# Patient Record
Sex: MALE | Race: WHITE
[De-identification: names, ages, dates, MRNs, and addresses within clinical notes are randomized per-mention and may not be internally consistent; named-entity substitution may affect disease eponyms.]

---

## 2018-01-05 ENCOUNTER — HOSPITAL ENCOUNTER (EMERGENCY)
Dept: HOSPITAL 45 - C.EDB | Age: 58
Discharge: HOME | End: 2018-01-05
Payer: COMMERCIAL

## 2018-01-05 VITALS — TEMPERATURE: 98.6 F

## 2018-01-05 VITALS
HEIGHT: 65.98 IN | BODY MASS INDEX: 38.23 KG/M2 | WEIGHT: 237.88 LBS | BODY MASS INDEX: 38.23 KG/M2 | WEIGHT: 237.88 LBS | HEIGHT: 65.98 IN

## 2018-01-05 VITALS — HEART RATE: 68 BPM | DIASTOLIC BLOOD PRESSURE: 72 MMHG | SYSTOLIC BLOOD PRESSURE: 115 MMHG | OXYGEN SATURATION: 95 %

## 2018-01-05 DIAGNOSIS — A04.72: Primary | ICD-10-CM

## 2018-01-05 DIAGNOSIS — E87.6: ICD-10-CM

## 2018-01-05 DIAGNOSIS — Z79.82: ICD-10-CM

## 2018-01-05 LAB
ALBUMIN SERPL-MCNC: 3.5 GM/DL (ref 3.4–5)
ALP SERPL-CCNC: 89 U/L (ref 45–117)
ALT SERPL-CCNC: 48 U/L (ref 12–78)
AST SERPL-CCNC: 37 U/L (ref 15–37)
BASOPHILS # BLD: 0.02 K/UL (ref 0–0.2)
BASOPHILS NFR BLD: 0.1 %
BUN SERPL-MCNC: 16 MG/DL (ref 7–18)
CALCIUM SERPL-MCNC: 8.7 MG/DL (ref 8.5–10.1)
CO2 SERPL-SCNC: 28 MMOL/L (ref 21–32)
CREAT SERPL-MCNC: 1.06 MG/DL (ref 0.6–1.4)
EOS ABS #: 0.1 K/UL (ref 0–0.5)
EOSINOPHIL NFR BLD AUTO: 243 K/UL (ref 130–400)
GLUCOSE SERPL-MCNC: 109 MG/DL (ref 70–99)
HCT VFR BLD CALC: 46.3 % (ref 42–52)
HGB BLD-MCNC: 16.2 G/DL (ref 14–18)
IG#: 0.09 K/UL (ref 0–0.02)
IMM GRANULOCYTES NFR BLD AUTO: 9.2 %
LYMPHOCYTES # BLD: 1.83 K/UL (ref 1.2–3.4)
MCH RBC QN AUTO: 29.3 PG (ref 25–34)
MCHC RBC AUTO-ENTMCNC: 35 G/DL (ref 32–36)
MCV RBC AUTO: 83.7 FL (ref 80–100)
MONO ABS #: 1.06 K/UL (ref 0.11–0.59)
MONOCYTES NFR BLD: 5.3 %
NEUT ABS #: 16.84 K/UL (ref 1.4–6.5)
NEUTROPHILS # BLD AUTO: 0.5 %
NEUTROPHILS NFR BLD AUTO: 84.4 %
PMV BLD AUTO: 9.7 FL (ref 7.4–10.4)
POTASSIUM SERPL-SCNC: 3.2 MMOL/L (ref 3.5–5.1)
PROT SERPL-MCNC: 7.7 GM/DL (ref 6.4–8.2)
RED CELL DISTRIBUTION WIDTH CV: 13.9 % (ref 11.5–14.5)
RED CELL DISTRIBUTION WIDTH SD: 42.9 FL (ref 36.4–46.3)
SODIUM SERPL-SCNC: 133 MMOL/L (ref 136–145)
WBC # BLD AUTO: 19.94 K/UL (ref 4.8–10.8)

## 2018-01-05 NOTE — DIAGNOSTIC IMAGING REPORT
CT ABD/PELVIS IV AND ORAL CONT



CLINICAL HISTORY: Lower abdominal pain. History of antibiotic use.    



COMPARISON STUDY:  None.



TECHNIQUE: Following the IV administration of 93 mL of Optiray-320, CT scan of

the abdomen and pelvis was performed from the lung bases to the proximal femurs.

Images are reviewed in the axial, sagittal, and coronal planes. IV contrast was

administered without complication.  A dose lowering technique was utilized

adhering to the principles of ALARA.





CT DOSE: 1478.54 mGy.cm



FINDINGS:



Lower chest: The heart is normal in size and configuration, without pericardial

effusion. The lung bases and pleural spaces are clear.



Liver: The contrast-enhanced liver is normal in size, contour, and attenuation.

There is no intrahepatic biliary ductal dilatation. The hepatic veins and portal

veins are patent.



Gallbladder: Unremarkable.



Spleen: There is scattered nonspecific splenic hypodensities measuring up to 9

mm.



Pancreas: Unremarkable.



Adrenal glands: Unremarkable.



Kidneys: There is an 18 mm left renal cyst



Bowel: There are no transition zones indicate bowel obstruction. There is

colonic diverticulosis. There is no evidence of acute appendicitis. There is

colonic bowel wall thickening most pronounced involving the sigmoid and rectum.

The findings are indicative of a colitis.



Peritoneum: There is no intraperitoneal free air or abdominal ascites.



Vasculature: The abdominal aorta is normal in course and caliber.



Adenopathy: None.



Pelvic viscera: The bladder, and pelvic viscera are unremarkable.



Skeletal structures: No destructive osseous lesions are seen.



IMPRESSION:  

1. No evidence of bowel obstruction. No evidence of free air

2. Scattered nonspecific subcentimeter splenic hypodensities

3. No evidence of acute appendicitis

4. Colonic wall thickening most pronounced involving the rectum and sigmoid. The

findings are indicative of a nonspecific colitis







Electronically signed by:  Trung Hylton M.D.

1/5/2018 1:57 PM



Dictated Date/Time:  1/5/2018 1:44 PM

## 2018-01-05 NOTE — EMERGENCY ROOM VISIT NOTE
History


First contact with patient:  10:50


Chief Complaint:  DIARRHEA


Stated Complaint:  "BOWEL PAIN" - LIQUID STOOL FOR 4 DAYS


Nursing Triage Summary:  


c/o diarrhea over the last several days after taking Augmentin





History of Present Illness


The patient is a 57 year old male who presents to the Emergency Room via 

private vehicle with complaints of "bowel painliquid stool for 4 days".  The 

patient states that 4 days ago he abruptly started with diarrhea, and lower 

quadrant abdominal pain at baseline rated as a 3/10 and at times goes to an 8/

10.  He notes that he started taking Augmentin on December 30 for a sinus 

infection.  He notes that he has a bowel movement now every hour.  He rates the 

pain currently at bedside is a 3/10.  There is been no vomiting.





Review of Systems


A complete 10-point Review of Systems was discussed with the patient, with 

pertinent positives and negatives listed in the History of Present Illness. All 

remaining Review of Systems questions can be considered negative unless 

otherwise specified.





Past Medical/Surgical History


Noncontributory.





Family History


Noncontributory.





Social History


Smoking Status:  Never Smoker


Patient lives locally.





Current/Historical Medications


Scheduled


Amlodipine (Norvasc), 5 MG PO DAILY


Aspirin (Aspirin Ec), 325 MG PO DAILY


Enalapril Maleate (Vasotec), 1 TAB PO DAILY


Fluoxetine (Prozac), 60 MG PO QAM


Hydrochlorothiazide (Hctz), 25 MG PO DAILY





Physical Exam


Vital Signs











  Date Time  Temp Pulse Resp B/P (MAP) Pulse Ox O2 Delivery O2 Flow Rate FiO2


 


1/5/18 16:10  68 18 115/72 95 Room Air  


 


1/5/18 14:48  96 18 122/88 95 Room Air  


 


1/5/18 12:30   18 143/84 94 Room Air  


 


1/5/18 11:26 37.0 59 20 137/80 91 Room Air  


 


1/5/18 10:46 37.0 86 20 142/83 97 Room Air  











Physical Exam


VITAL SIGNS - Vital signs and nursing notes were reviewed.  Stable.  Afebrile.


GENERAL -57-year-old female appearing his stated age who is in no acute 

distress. Communicates well with provider and answers questions appropriately.


SKIN - Without rashes.  No petechial rashes.


LUNGS - Chest wall symmetric without accessory muscle use, intercostals 

retractions, or central cyanosis. Normal vesicular breath sounds CTA B/L. No 

wheezes, rales, or rhonchi appreciated.


CARDIAC - RRR with S1/S2. No murmur, rubs, or gallops appreciated. 


ABDOMEN - Abdominal contour normal without pulsations or visible masses. BS 

normoactive all four quadrants. No tenderness, palpable masses, 

hepatosplenomegaly, or ascites noted.





Medical Decision & Procedures


ER Provider


Diagnostic Interpretation:


[~ rep ct add3]]


CT ABD/PELVIS IV AND ORAL CONT





CLINICAL HISTORY: Lower abdominal pain. History of antibiotic use.    





COMPARISON STUDY:  None.





TECHNIQUE: Following the IV administration of 93 mL of Optiray-320, CT scan of


the abdomen and pelvis was performed from the lung bases to the proximal femurs.


Images are reviewed in the axial, sagittal, and coronal planes. IV contrast was


administered without complication.  A dose lowering technique was utilized


adhering to the principles of ALARA.








CT DOSE: 1478.54 mGy.cm





FINDINGS:





Lower chest: The heart is normal in size and configuration, without pericardial


effusion. The lung bases and pleural spaces are clear.





Liver: The contrast-enhanced liver is normal in size, contour, and attenuation.


There is no intrahepatic biliary ductal dilatation. The hepatic veins and portal


veins are patent.





Gallbladder: Unremarkable.





Spleen: There is scattered nonspecific splenic hypodensities measuring up to 9


mm.





Pancreas: Unremarkable.





Adrenal glands: Unremarkable.





Kidneys: There is an 18 mm left renal cyst





Bowel: There are no transition zones indicate bowel obstruction. There is


colonic diverticulosis. There is no evidence of acute appendicitis. There is


colonic bowel wall thickening most pronounced involving the sigmoid and rectum.


The findings are indicative of a colitis.





Peritoneum: There is no intraperitoneal free air or abdominal ascites.





Vasculature: The abdominal aorta is normal in course and caliber.





Adenopathy: None.





Pelvic viscera: The bladder, and pelvic viscera are unremarkable.





Skeletal structures: No destructive osseous lesions are seen.





IMPRESSION:  


1. No evidence of bowel obstruction. No evidence of free air


2. Scattered nonspecific subcentimeter splenic hypodensities


3. No evidence of acute appendicitis


4. Colonic wall thickening most pronounced involving the rectum and sigmoid. The


findings are indicative of a nonspecific colitis











Electronically signed by:  Trung Hylton M.D.


1/5/2018 1:57 PM





Dictated Date/Time:  1/5/2018 1:44 PM





Laboratory Results


1/5/18 11:30








Red Blood Count 5.53, Mean Corpuscular Volume 83.7, Mean Corpuscular Hemoglobin 

29.3, Mean Corpuscular Hemoglobin Concent 35.0, Mean Platelet Volume 9.7, 

Neutrophils (%) (Auto) 84.4, Lymphocytes (%) (Auto) 9.2, Monocytes (%) (Auto) 

5.3, Eosinophils (%) (Auto) 0.5, Basophils (%) (Auto) 0.1, Neutrophils # (Auto) 

16.84, Lymphocytes # (Auto) 1.83, Monocytes # (Auto) 1.06, Eosinophils # (Auto) 

0.10, Basophils # (Auto) 0.02





1/5/18 11:30

















Test


  1/5/18


11:30 1/5/18


12:43


 


White Blood Count


  19.94 K/uL


(4.8-10.8) 


 


 


Red Blood Count


  5.53 M/uL


(4.7-6.1) 


 


 


Hemoglobin


  16.2 g/dL


(14.0-18.0) 


 


 


Hematocrit 46.3 % (42-52)  


 


Mean Corpuscular Volume


  83.7 fL


() 


 


 


Mean Corpuscular Hemoglobin


  29.3 pg


(25-34) 


 


 


Mean Corpuscular Hemoglobin


Concent 35.0 g/dl


(32-36) 


 


 


Platelet Count


  243 K/uL


(130-400) 


 


 


Mean Platelet Volume


  9.7 fL


(7.4-10.4) 


 


 


Neutrophils (%) (Auto) 84.4 %  


 


Lymphocytes (%) (Auto) 9.2 %  


 


Monocytes (%) (Auto) 5.3 %  


 


Eosinophils (%) (Auto) 0.5 %  


 


Basophils (%) (Auto) 0.1 %  


 


Neutrophils # (Auto)


  16.84 K/uL


(1.4-6.5) 


 


 


Lymphocytes # (Auto)


  1.83 K/uL


(1.2-3.4) 


 


 


Monocytes # (Auto)


  1.06 K/uL


(0.11-0.59) 


 


 


Eosinophils # (Auto)


  0.10 K/uL


(0-0.5) 


 


 


Basophils # (Auto)


  0.02 K/uL


(0-0.2) 


 


 


RDW Standard Deviation


  42.9 fL


(36.4-46.3) 


 


 


RDW Coefficient of Variation


  13.9 %


(11.5-14.5) 


 


 


Immature Granulocyte % (Auto) 0.5 %  


 


Immature Granulocyte # (Auto)


  0.09 K/uL


(0.00-0.02) 


 


 


Anion Gap


  6.0 mmol/L


(3-11) 


 


 


Est Creatinine Clear Calc


Drug Dose 89.6 ml/min 


  


 


 


Estimated GFR (


American) 90.5 


  


 


 


Estimated GFR (Non-


American 78.1 


  


 


 


BUN/Creatinine Ratio 15.5 (10-20)  


 


Calcium Level


  8.7 mg/dl


(8.5-10.1) 


 


 


Magnesium Level


  2.5 mg/dl


(1.8-2.4) 


 


 


Total Bilirubin


  0.4 mg/dl


(0.2-1) 


 


 


Aspartate Amino Transf


(AST/SGOT) 37 U/L (15-37) 


  


 


 


Alanine Aminotransferase


(ALT/SGPT) 48 U/L (12-78) 


  


 


 


Alkaline Phosphatase


  89 U/L


() 


 


 


Total Protein


  7.7 gm/dl


(6.4-8.2) 


 


 


Albumin


  3.5 gm/dl


(3.4-5.0) 


 


 


Globulin


  4.2 gm/dl


(2.5-4.0) 


 


 


Albumin/Globulin Ratio 0.8 (0.9-2)  


 


Urine Color  DK YELLOW 


 


Urine Appearance  CLEAR (CLEAR) 


 


Urine pH  5.5 (4.5-7.5) 


 


Urine Specific Gravity


  


  1.017


(1.000-1.030)


 


Urine Protein  NEG (NEG) 


 


Urine Glucose (UA)  NEG (NEG) 


 


Urine Ketones  NEG (NEG) 


 


Urine Occult Blood  NEG (NEG) 


 


Urine Nitrite  NEG (NEG) 


 


Urine Bilirubin  NEG (NEG) 


 


Urine Urobilinogen  NEG (NEG) 


 


Urine Leukocyte Esterase  TRACE (NEG) 


 


Urine WBC (Auto)  1-5 /hpf (0-5) 


 


Urine RBC (Auto)  0-4 /hpf (0-4) 


 


Urine Hyaline Casts (Auto)


  


  5-10 /lpf


(0-5)


 


Urine Epithelial Cells (Auto)


  


  10-20 /lpf


(0-5)


 


Urine Bacteria (Auto)  NEG (NEG) 


 


Urine Pathogenic Casts   /lpf (0) 


 


Urine Mucus


  


  PRESENT (NONE


PRSENT)














 Date/Time


Source Procedure


Growth Status


 


 


 1/5/18 12:43


Stool C.difficile Toxin B Gene (PCR) - Final


Positive for C. difficile toxin B gene Complete











Medications Administered











 Medications


  (Trade)  Dose


 Ordered  Sig/Kaden


 Route  Start Time


 Stop Time Status Last Admin


Dose Admin


 


 Sodium Chloride  1,000 ml @ 


 999 mls/hr  Q1H1M STAT


 IV  1/5/18 10:59


 1/5/18 11:59 DC 1/5/18 11:23


999 MLS/HR


 


 Vancomycin HCl


  (Vancomycin Oral


 Soln)  125 mg  NOW  STAT


 PO  1/5/18 15:29


 1/5/18 15:30 DC 1/5/18 16:09


125 MG


 


 Raspberry


  (Raspberry Syrup


 5ml Cup)  5 ml  1545  ONCE


 PO  1/5/18 15:45


 1/5/18 15:46 DC 1/5/18 16:09


5 ML











Medical Decision


Patient was seen and evaluated as above.  He presents to us today with 

intermittent abdominal pain and frequent stools.  This is after beginning 

antibiotic.  He has no history of Clostridium difficile infection.  He presents 

as such.  IV access is initiated, and the above workup was performed.  

Leukocytosis near 20,000.  CT scan of the abdomen and pelvis was obtained with 

the use of intravenous and oral contrast.  This reveals a nonspecific colitis 

inferior in the sigmoid and rectum region.  He tested positive for the C. 

difficile toxin B gene.  He'll be treated with oral vancomycin.  I did speak 

with the pharmacist as well as the compounding pharmacist at [facility] regarding this medication.  It is in stock.  The patient will be 

discharged after one dose given here to pick this up.  He was educated upon 

management.  There is no evidence of toxic megacolon.  He is nontoxic on exam.  

He declines pain medication.  He is to call his family doctor for follow-up.  

No concerning anemia.  Metabolic panel reveals hyponatremia, hypokalemia.  

There is also hypermagnesemia.  Urine reveals trace mucus, however there are 

epithelial cells present.  The patient was educated upon management, educated 

upon worrisome symptoms which to return, had questions and provided discharge, 

and was discharged home in good condition.





In evaluation treatment this patient the following differential diagnoses were 

entertained: C. difficile, diverticulitis, appendicitis, among others.





Impression





 Primary Impression:  


 C. difficile colitis


 Additional Impression:  


 Hypokalemia





Departure Information


Dispostion


Home / Self-Care





Condition


GOOD





Referrals


Nila Calero M.D. (PCP)





Patient Instructions


Clostridium Difficile Infec, Clostridium Difficile Toxin Stool, Hypokalemia [place], 

[facility]





Additional Instructions





You have been treated in the Emergency Department your Abdominal Pain. 





You have an infection in the intestine called C. diff





Vancomycin: 125 mg 4 times daily for 10 days (at [facility])








For pain control, you can use the following over-the-counter medicines (if >13 yo):





- Regular strength (325mg/tab) Tylenol (acetaminophen) 2 tabs every 4-6 hours 

as needed. Do not exceed 12 tablets in a 24 hour period. Avoid taking more than 

3 grams (3000 mg) of Tylenol per day. This includes any other sources of 

acetaminophen you may take on a regular basis.





- Regular strength (200 mg/tab) Advil (ibuprofen) 1-2 tabs every 4-6 hours as 

needed. Do not exceed a dose of 3200 mg per day.





Drink plenty of water and stay well hydrated. 





As with any trip to the Emergency Department, you should follow-up with your 

Primary Care Provider from today's visit. Have potassium rechecked.





Return to the emergency department if your symptoms persist despite treatment 

plan outlined above or if the following symptoms occur: increased fevers, chills

, worsening nausea/vomiting, blood in your stool or urine.





Problem Qualifiers